# Patient Record
Sex: FEMALE | Race: ASIAN | NOT HISPANIC OR LATINO | Employment: STUDENT | URBAN - METROPOLITAN AREA
[De-identification: names, ages, dates, MRNs, and addresses within clinical notes are randomized per-mention and may not be internally consistent; named-entity substitution may affect disease eponyms.]

---

## 2022-12-02 ENCOUNTER — HOSPITAL ENCOUNTER (EMERGENCY)
Facility: HOSPITAL | Age: 22
Discharge: HOME | End: 2022-12-02
Attending: EMERGENCY MEDICINE
Payer: MEDICAID

## 2022-12-02 VITALS
HEIGHT: 62 IN | TEMPERATURE: 99 F | HEART RATE: 94 BPM | WEIGHT: 147.06 LBS | RESPIRATION RATE: 18 BRPM | OXYGEN SATURATION: 100 % | SYSTOLIC BLOOD PRESSURE: 128 MMHG | DIASTOLIC BLOOD PRESSURE: 69 MMHG | BODY MASS INDEX: 27.06 KG/M2

## 2022-12-02 DIAGNOSIS — R10.31 RIGHT GROIN PAIN: ICD-10-CM

## 2022-12-02 DIAGNOSIS — L02.91 ABSCESS: Primary | ICD-10-CM

## 2022-12-02 PROCEDURE — 99282 EMERGENCY DEPT VISIT SF MDM: CPT | Mod: 25

## 2022-12-02 PROCEDURE — 25000003 PHARM REV CODE 250: Performed by: NURSE PRACTITIONER

## 2022-12-02 PROCEDURE — 10060 I&D ABSCESS SIMPLE/SINGLE: CPT

## 2022-12-02 RX ORDER — LIDOCAINE HYDROCHLORIDE 20 MG/ML
5 INJECTION, SOLUTION INFILTRATION; PERINEURAL
Status: COMPLETED | OUTPATIENT
Start: 2022-12-02 | End: 2022-12-02

## 2022-12-02 RX ADMIN — LIDOCAINE HYDROCHLORIDE 5 ML: 20 INJECTION, SOLUTION INFILTRATION; PERINEURAL at 06:12

## 2022-12-02 NOTE — Clinical Note
"Leslee Leielle" Rocío was seen and treated in our emergency department on 12/2/2022.  She may return to school on 12/03/2022.      If you have any questions or concerns, please don't hesitate to call.      Blake Segura NP"

## 2022-12-03 NOTE — ED PROVIDER NOTES
"Encounter Date: 12/2/2022       History     Chief Complaint   Patient presents with    Groin Pain     Pt arrives c/o right groin pain, fever, and states a "flat, hardened, lump" developed at the beginning of this week with no resolution.     Patient complains of right groin abscess for several days.  States she felt like she got hot yesterday but did not check her temperature took some ibuprofen which mitigated    The history is provided by the patient.   Groin Pain  This is a new problem. The current episode started more than 2 days ago. The problem occurs constantly. The problem has been gradually worsening. Pertinent negatives include no chest pain, no abdominal pain, no headaches and no shortness of breath. Nothing aggravates the symptoms. Nothing relieves the symptoms. She has tried nothing for the symptoms.   Review of patient's allergies indicates:  No Known Allergies  No past medical history on file.  No past surgical history on file.  No family history on file.     Review of Systems   Constitutional:  Negative for fever.   HENT:  Negative for sore throat.    Respiratory:  Negative for shortness of breath.    Cardiovascular:  Negative for chest pain.   Gastrointestinal:  Negative for abdominal pain and nausea.   Genitourinary:  Negative for dysuria.   Musculoskeletal:  Negative for back pain.   Skin:  Negative for rash.   Neurological:  Negative for weakness and headaches.   Hematological:  Does not bruise/bleed easily.     Physical Exam     Initial Vitals [12/02/22 1741]   BP Pulse Resp Temp SpO2   128/69 94 18 99.3 °F (37.4 °C) 100 %      MAP       --         Physical Exam    Nursing note and vitals reviewed.  Constitutional: She appears well-developed and well-nourished. She is not diaphoretic. She is active.  Non-toxic appearance. No distress.   HENT:   Head: Normocephalic and atraumatic.   Eyes: Conjunctivae are normal. Right eye exhibits no discharge. Left eye exhibits no discharge. No scleral icterus. "   Neck:   Normal range of motion.  Cardiovascular:  Normal rate, regular rhythm and intact distal pulses.           No murmur heard.  Pulmonary/Chest: Breath sounds normal. No respiratory distress. She has no wheezes.   Abdominal: She exhibits no distension.   Genitourinary:    Genitourinary Comments: ERT chaperoned         Musculoskeletal:         General: No tenderness. Normal range of motion.      Cervical back: Normal range of motion.     Neurological: She is alert and oriented to person, place, and time. No cranial nerve deficit. GCS score is 15. GCS eye subscore is 4. GCS verbal subscore is 5. GCS motor subscore is 6.   Skin: Skin is warm and dry. Capillary refill takes less than 2 seconds. No rash noted.   Psychiatric: She has a normal mood and affect. Her behavior is normal. Judgment and thought content normal.       ED Course   I & D - Incision and Drainage    Date/Time: 12/2/2022 6:32 PM  Location procedure was performed: [facility] EMERGENCY DEPARTMENT  Performed by: Blake Segura NP  Authorized by: Blake Segura NP   Type: abscess  Location: Right groin.  Anesthesia: local infiltration    Anesthesia:  Local Anesthetic: lidocaine 2% without epinephrine  Scalpel size: 11  Complexity: simple  Drainage: pus  Drainage amount: copious  Wound treatment: wound packed, drainage and incision  Packing material: 1/4 in gauze  Complications: No  Specimens: No  Implants: No      Labs Reviewed   HIV 1 / 2 ANTIBODY   HEPATITIS C ANTIBODY   HEP C VIRUS HOLD SPECIMEN          Imaging Results    None          Medications   LIDOcaine HCL 20 mg/ml (2%) injection 5 mL (5 mLs Infiltration Given by Other 12/2/22 1822)                  I discussed with patient and/or family/caretaker that evaluation in the ED does not suggest any emergent or life threatening medical conditions requiring immediate intervention beyond what was provided in the ED, and I believe patient is safe for discharge.  Regardless, an unremarkable  evaluation in the ED does not preclude the development or presence of a serious of life threatening condition. As such, patient was instructed to return immediately for any worsening or change in current symptoms.             Clinical Impression:   Final diagnoses:  [L02.91] Abscess (Primary)  [R10.31] Right groin pain      ED Disposition Condition    Discharge Stable          ED Prescriptions    None       Follow-up Information       Follow up With Specialties Details Why Contact Info    PCP  Schedule an appointment as soon as possible for a visit  As needed              Blake Segura NP  12/02/22 8824